# Patient Record
(demographics unavailable — no encounter records)

---

## 2025-04-29 NOTE — DISCUSSION/SUMMARY
[EKG obtained to assist in diagnosis and management of assessed problem(s)] : EKG obtained to assist in diagnosis and management of assessed problem(s) [FreeTextEntry1] : 1.  Atrial flutter: Patient with a [id number] score of 0, continue metoprolol 25 mg daily will consider CHERISE cardioversion and placement on Eliquis for before and after however need to speak with and confer with radiation oncology as to timing and potential anticoagulation contraindications placement of the rectal spacer.  Echo 2.  Hyperlipidemia: Continue atorvastatin 3.  Carotid bruit: Carotid duplex  ADDENDUM: Spoke with nurse practitioner Melany ( to Dr. Stanford) patient planned to undergo placement of a rectal spacer on 12 May.  Will hold off on CHERISE/cardioversion until after placement of the spacer.  No cardiac contraindication to planned procedure.  Advised for now to continue aspirin 81 mg and metoprolol.  Will consider CHERISE cardioversion sometime after the spacer is placed.

## 2025-04-29 NOTE — PHYSICAL EXAM

## 2025-05-14 NOTE — DISCUSSION/SUMMARY
[FreeTextEntry1] : 1.  Typical atrial flutter: EKG; patient does have a history of esophageal issues which required interventions.  As such we will start anticoagulation with Eliquis 5 mg twice daily for the next 4 weeks plan for a cardioversion under sedation and continue anticoagulations for 6 weeks thereafter.  Continue current medication. [EKG obtained to assist in diagnosis and management of assessed problem(s)] : EKG obtained to assist in diagnosis and management of assessed problem(s)

## 2025-05-14 NOTE — PHYSICAL EXAM

## 2025-05-14 NOTE — HISTORY OF PRESENT ILLNESS
[FreeTextEntry1] : 69-year-old male with past medical history of prostate CA status post rectal spacer for planned radiation treatment comes in for follow-up.  Overall, he feels well denies chest pain shortness of breath PND orthopnea or palpitations from the underlying atrial flutter.